# Patient Record
Sex: FEMALE | Race: WHITE | NOT HISPANIC OR LATINO | URBAN - METROPOLITAN AREA
[De-identification: names, ages, dates, MRNs, and addresses within clinical notes are randomized per-mention and may not be internally consistent; named-entity substitution may affect disease eponyms.]

---

## 2022-08-02 ENCOUNTER — WEB ENCOUNTER (OUTPATIENT)
Dept: URBAN - METROPOLITAN AREA CLINIC 78 | Facility: CLINIC | Age: 22
End: 2022-08-02

## 2022-08-08 ENCOUNTER — OFFICE VISIT (OUTPATIENT)
Dept: URBAN - METROPOLITAN AREA CLINIC 78 | Facility: CLINIC | Age: 22
End: 2022-08-08
Payer: COMMERCIAL

## 2022-08-08 ENCOUNTER — DASHBOARD ENCOUNTERS (OUTPATIENT)
Age: 22
End: 2022-08-08

## 2022-08-08 VITALS
HEART RATE: 76 BPM | BODY MASS INDEX: 24.98 KG/M2 | DIASTOLIC BLOOD PRESSURE: 84 MMHG | HEIGHT: 72 IN | WEIGHT: 184.4 LBS | TEMPERATURE: 97.6 F | RESPIRATION RATE: 16 BRPM | SYSTOLIC BLOOD PRESSURE: 129 MMHG

## 2022-08-08 DIAGNOSIS — Z80.0 FAMILY HISTORY OF COLON CANCER: ICD-10-CM

## 2022-08-08 DIAGNOSIS — L29.0 ANAL ITCHING: ICD-10-CM

## 2022-08-08 DIAGNOSIS — K62.89 RECTAL PAIN: ICD-10-CM

## 2022-08-08 DIAGNOSIS — K62.5 RECTAL BLEEDING: ICD-10-CM

## 2022-08-08 PROCEDURE — 99203 OFFICE O/P NEW LOW 30 MIN: CPT | Performed by: INTERNAL MEDICINE

## 2022-08-08 PROCEDURE — 99243 OFF/OP CNSLTJ NEW/EST LOW 30: CPT | Performed by: INTERNAL MEDICINE

## 2022-08-08 RX ORDER — SODIUM, POTASSIUM,MAG SULFATES 17.5-3.13G
177 ML DAY 1 AND 177 ML DAY 2 SOLUTION, RECONSTITUTED, ORAL ORAL
Qty: 354 MILLILITER | Refills: 0 | OUTPATIENT
Start: 2022-08-08 | End: 2022-08-10

## 2022-08-08 NOTE — HPI-TODAY'S VISIT:
Patient was referred by Dr. Khusbhubehan Patel  A copy of this document will be sent to the physician.   Lots of GI problems  with Dx of IBS by PCP  Patient had symptoms of hemorrhoids 4 weeks ago 7/8 /22  , symptoms of it -> rectal pain , itching  Stool studies done which r/o parasite and C.diff   Symptoms ; Pain , rectal with BM  Blood on toilet paper and in toilet and in stools  Bianca anal itching   Abdominal bloating post prandial  BM mainly normal formed stools with infreq diarrhea  Takes metamucil daily and probiotics daily  Tries not to strain ( tries to have defecatory symptoms )  Constant nausea  hx of anxiety  Tries to track food ( failed low FODMAP diet )  PCP did RICKY , no external hemorrhoids   Played soccer in college  GM ( P ) colon cancer 60-70  GM ( M ) 30-40's  Mother gets colonoscopy every few year  Father had Glossopharygeal cancer   Labs   Stool test are negative  FIT negative  Fecal leukocytes negative

## 2022-08-24 PROBLEM — 12063002: Status: ACTIVE | Noted: 2022-08-16

## 2022-10-04 ENCOUNTER — OFFICE VISIT (OUTPATIENT)
Dept: URBAN - METROPOLITAN AREA SURGERY CENTER 15 | Facility: SURGERY CENTER | Age: 22
End: 2022-10-04

## 2022-10-11 ENCOUNTER — WEB ENCOUNTER (OUTPATIENT)
Dept: URBAN - METROPOLITAN AREA SURGERY CENTER 15 | Facility: SURGERY CENTER | Age: 22
End: 2022-10-11

## 2022-10-13 ENCOUNTER — OFFICE VISIT (OUTPATIENT)
Dept: URBAN - METROPOLITAN AREA SURGERY CENTER 15 | Facility: SURGERY CENTER | Age: 22
End: 2022-10-13
Payer: COMMERCIAL

## 2022-10-13 DIAGNOSIS — K62.5 ANAL BLEEDING: ICD-10-CM

## 2022-10-13 PROCEDURE — G8907 PT DOC NO EVENTS ON DISCHARG: HCPCS | Performed by: INTERNAL MEDICINE

## 2022-10-13 PROCEDURE — 45378 DIAGNOSTIC COLONOSCOPY: CPT | Performed by: INTERNAL MEDICINE
